# Patient Record
Sex: FEMALE | Race: ASIAN | URBAN - METROPOLITAN AREA
[De-identification: names, ages, dates, MRNs, and addresses within clinical notes are randomized per-mention and may not be internally consistent; named-entity substitution may affect disease eponyms.]

---

## 2020-10-13 ENCOUNTER — APPOINTMENT (RX ONLY)
Dept: URBAN - METROPOLITAN AREA CLINIC 145 | Facility: CLINIC | Age: 25
Setting detail: DERMATOLOGY
End: 2020-10-13

## 2020-10-13 DIAGNOSIS — Z02.9 ENCOUNTER FOR ADMINISTRATIVE EXAMINATIONS, UNSPECIFIED: ICD-10-CM

## 2020-11-03 ENCOUNTER — APPOINTMENT (RX ONLY)
Dept: URBAN - METROPOLITAN AREA CLINIC 145 | Facility: CLINIC | Age: 25
Setting detail: DERMATOLOGY
End: 2020-11-03

## 2020-11-03 DIAGNOSIS — Z41.9 ENCOUNTER FOR PROCEDURE FOR PURPOSES OTHER THAN REMEDYING HEALTH STATE, UNSPECIFIED: ICD-10-CM

## 2020-11-03 PROCEDURE — ? COUNSELING

## 2020-11-03 PROCEDURE — ? COSMETIC QUOTE

## 2020-11-03 PROCEDURE — ? ADDITIONAL NOTES

## 2020-11-03 PROCEDURE — ? DEFER

## 2020-11-03 PROCEDURE — ? CONSULTATION EXCISION

## 2020-11-03 ASSESSMENT — LOCATION SIMPLE DESCRIPTION DERM
LOCATION SIMPLE: RIGHT LIP
LOCATION SIMPLE: LEFT LIP

## 2020-11-03 ASSESSMENT — LOCATION DETAILED DESCRIPTION DERM
LOCATION DETAILED: RIGHT SUPERIOR VERMILION LIP
LOCATION DETAILED: LEFT UPPER CUTANEOUS LIP

## 2020-11-03 ASSESSMENT — LOCATION ZONE DERM: LOCATION ZONE: LIP

## 2020-11-03 NOTE — PROCEDURE: DEFER
Introduction Text (Please End With A Colon): The following procedure was deferred. Discussed procedure with potential risks and complications. All questions were answered.
Detail Level: Detailed
Procedure To Be Performed At Next Visit: Excision (Cosmetic)

## 2020-11-03 NOTE — PROCEDURE: COSMETIC QUOTE
Detail Level: Detailed
Body Procedure 9 Price/Unit (In Dollars- Use Only Numbers And Decimals): 0.00
Face Procedure 7 Units: 0
Face Procedure 1: elective excision without pathology 2 lesions
Global Amount Discount (Will Not Be Applied To Implants Or Outside Services): 750.00
Face Procedure 2 Price/Unit (In Dollars- Use Only Numbers And Decimals): 100.00
Number Of Months: 1
Patient Deposit Or Prepayments (Use Numbers Only): 80.00
Face Procedure 3 Price/Unit (In Dollars- Use Only Numbers And Decimals): 150.00
Send Charges To Patient Encounter: Yes
Include Sales Tax: No
Quote Title (Optional- Will Act As A Header): Nevus removal
Face Procedure 2: Thermal Destruction x2 lesions
Body Procedure 1: abdominoplasty
Face Procedure 3: Laser destruction 2 lesions
Detail Level: Zone

## 2020-11-03 NOTE — PROCEDURE: CONSULTATION EXCISION
Size Of Lesion: 0.3
Detail Level: Detailed
X Size Of Lesion In Cm (Optional): 0.2
Size Of Lesion: 0.4